# Patient Record
Sex: FEMALE | Race: WHITE | NOT HISPANIC OR LATINO | URBAN - METROPOLITAN AREA
[De-identification: names, ages, dates, MRNs, and addresses within clinical notes are randomized per-mention and may not be internally consistent; named-entity substitution may affect disease eponyms.]

---
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
## 2025-06-25 DIAGNOSIS — N94.6 DYSMENORRHEA IN ADOLESCENT: ICD-10-CM

## 2025-06-26 RX ORDER — DESOGESTREL AND ETHINYL ESTRADIOL 0.15-0.03
1 KIT ORAL DAILY
Qty: 84 TABLET | Refills: 0 | Status: SHIPPED | OUTPATIENT
Start: 2025-06-26 | End: 2025-09-18

## 2025-07-07 ENCOUNTER — OFFICE VISIT (OUTPATIENT)
Dept: OBSTETRICS AND GYNECOLOGY | Facility: CLINIC | Age: 20
End: 2025-07-07
Payer: COMMERCIAL

## 2025-07-07 VITALS
BODY MASS INDEX: 24.79 KG/M2 | SYSTOLIC BLOOD PRESSURE: 116 MMHG | HEIGHT: 65 IN | DIASTOLIC BLOOD PRESSURE: 74 MMHG | WEIGHT: 148.8 LBS

## 2025-07-07 DIAGNOSIS — R10.2 PELVIC PAIN: Primary | ICD-10-CM

## 2025-07-07 PROCEDURE — 3008F BODY MASS INDEX DOCD: CPT | Performed by: OBSTETRICS & GYNECOLOGY

## 2025-07-07 PROCEDURE — 99213 OFFICE O/P EST LOW 20 MIN: CPT | Performed by: OBSTETRICS & GYNECOLOGY

## 2025-07-07 RX ORDER — NORETHINDRONE ACETATE AND ETHINYL ESTRADIOL, ETHINYL ESTRADIOL AND FERROUS FUMARATE 1MG-10(24)
1 KIT ORAL DAILY
Qty: 28 TABLET | Refills: 1 | Status: SHIPPED | OUTPATIENT
Start: 2025-07-07 | End: 2025-09-05

## 2025-07-07 ASSESSMENT — ENCOUNTER SYMPTOMS
NEUROLOGICAL NEGATIVE: 0
MUSCULOSKELETAL NEGATIVE: 0
EYES NEGATIVE: 0
HEMATOLOGIC/LYMPHATIC NEGATIVE: 0
ENDOCRINE NEGATIVE: 0
CARDIOVASCULAR NEGATIVE: 0
RESPIRATORY NEGATIVE: 1
RESPIRATORY NEGATIVE: 0
PSYCHIATRIC NEGATIVE: 0
ALLERGIC/IMMUNOLOGIC NEGATIVE: 0
ENDOCRINE NEGATIVE: 1
CONSTITUTIONAL NEGATIVE: 0
GASTROINTESTINAL NEGATIVE: 0

## 2025-07-07 ASSESSMENT — PAIN SCALES - GENERAL: PAINLEVEL_OUTOF10: 0-NO PAIN

## 2025-07-07 NOTE — PROGRESS NOTES
Subjective   Patient ID: Jaclyn Scott is a 20 y.o. female who presents for Vaginal Bleeding (Reports constant cycle for past 2 mos/Currently on ocp's, reports blood bright red in color).  Vaginal Bleeding      States that she has been taking her birth control for 6 yrs, first few years she had 2-3 months of breakthrough bleeding that lasted days. Since this past may she has been bleeding every day, which she states is a large amount of blood. States it is approximately 3 days of bright red blood and then switches to dark red, and cycles with that pattern. She states she sis also passing clots. She said this is associated with debilitating cramps. States she is consistently taking Apri every morning at the same time. States that she has cramps with urination in the morning. She states that in the past she had similar episodes of bleeding that were shorter. She states that she feels this new episode is different and worse. 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 States she is not currently sexually active.     Review of Systems   Respiratory: Negative.     Endocrine: Negative.    Genitourinary:  Positive for vaginal bleeding.     Headache: present in both eyes, travels to bilateral temporal regions, describes as dull throbbing pain. She states her headache is not out of the ordinary for her.    Objective   Physical Exam  Cardiovascular:      Rate and Rhythm: Normal rate and regular rhythm.      Pulses: Normal pulses.   Pulmonary:      Effort: Pulmonary effort is normal.      Breath sounds: Normal breath sounds.   Abdominal:      General: Abdomen is flat. Bowel sounds are normal. There is no distension.      Palpations: Abdomen is soft. There is no mass.      Tenderness: There is abdominal tenderness. There is no guarding.   Psychiatric:         Mood and Affect: Mood normal.         Behavior: Behavior normal.     Abdominal tenderness present in mid-epigastric region with light and deep palpation. No  masses or abnormalities felt on palpation.    Assessment/Plan   Diagnoses and all orders for this visit:  Pelvic pain  -     US PELVIS TRANSABDOMINAL WITH TRANSVAGINAL; Future  -     norethindrone-e.estradioL-iron (Lo Loestrin Fe) 1 mg-10 mcg (24)/10 mcg (2) tablet; Take 1 tablet by mouth once daily.    - Patient was agreeable to trial of new contraceptive pill (Lo Loestrin) due to suspicion for current regimen (Apri) as etiology for bleeding. -Additionally, she was agreeable to repeating the Pelvic ultrasound to assess for structural uterine abnormalities, as her last Pelvic US was approximately 1 year ago.  - Return to clinic pending results of Pelvic US, or as needed for follow up.       Saul King DO 07/07/25 4:01 PM

## 2025-07-10 ENCOUNTER — HOSPITAL ENCOUNTER (OUTPATIENT)
Dept: RADIOLOGY | Facility: CLINIC | Age: 20
Discharge: HOME | End: 2025-07-10
Payer: COMMERCIAL

## 2025-07-10 DIAGNOSIS — R10.2 PELVIC PAIN: ICD-10-CM

## 2025-07-10 PROCEDURE — 76856 US EXAM PELVIC COMPLETE: CPT

## 2025-07-21 ENCOUNTER — OFFICE VISIT (OUTPATIENT)
Dept: PEDIATRICS | Facility: CLINIC | Age: 20
End: 2025-07-21
Payer: COMMERCIAL

## 2025-07-21 VITALS — WEIGHT: 146.3 LBS | TEMPERATURE: 98 F | BODY MASS INDEX: 24.37 KG/M2 | HEIGHT: 65 IN

## 2025-07-21 DIAGNOSIS — L50.9 URTICARIA: Primary | ICD-10-CM

## 2025-07-21 PROCEDURE — 3008F BODY MASS INDEX DOCD: CPT | Performed by: PEDIATRICS

## 2025-07-21 PROCEDURE — 99213 OFFICE O/P EST LOW 20 MIN: CPT | Performed by: PEDIATRICS

## 2025-07-21 NOTE — PROGRESS NOTES
"Subjective   Patient ID: Jaclyn Scott is a 20 y.o. female who presents for Rash.  Today she is accompanied by alone.     HPI    Hives on body yesterday - all day  Could not sleep because she was so itchy  Woke with the hives today  Improved now  Did take benadryl last night without much help  Did not take today  No illness  No fevers  No new foods  No new exposures  Her OCP brand was switched- has been taking the new brand x 7 days    Review of systems negative unless otherwise indicated in HPI    Objective   Temp 36.7 °C (98 °F)   Ht 1.645 m (5' 4.75\")   Wt 66.4 kg (146 lb 4.8 oz)   LMP 05/25/2025 (Exact Date)   BMI 24.53 kg/m²     Physical Exam  General: alert, active, in no acute distress  Hydration: well-hydrated, mucous membranes moist, good skin turgor  Eyes: conjunctiva clear  Ears: TM's normal, external auditory canals are clear   Nose: clear, no discharge  Throat: moist mucous membranes without erythema, exudates or petechiae, no post-nasal drainage seen  Neck: no lymphadenopathy  Lungs: clear to auscultation, no wheezing, crackles or rhonchi, breathing unlabored  Heart: Normal PMI. regular rate and rhythm, normal S1, S2, no murmurs or gallops.     Assessment/Plan   Problem List Items Addressed This Visit    None  Visit Diagnoses         Urticaria    -  Primary          Urticaria- likely viral induced  Supportive Care  Call if worse, not improved, new fever  If hives persist into the end of the week consider a course of oral steroid    Khadra Waggoner MD   "

## 2025-08-04 ENCOUNTER — PATIENT MESSAGE (OUTPATIENT)
Dept: OBSTETRICS AND GYNECOLOGY | Facility: CLINIC | Age: 20
End: 2025-08-04
Payer: COMMERCIAL

## 2025-08-04 DIAGNOSIS — R10.2 PELVIC PAIN: ICD-10-CM

## 2025-08-05 RX ORDER — NORETHINDRONE ACETATE AND ETHINYL ESTRADIOL, ETHINYL ESTRADIOL AND FERROUS FUMARATE 1MG-10(24)
1 KIT ORAL DAILY
Qty: 112 TABLET | Refills: 3 | Status: SHIPPED | OUTPATIENT
Start: 2025-08-05 | End: 2026-08-05

## 2025-09-04 ENCOUNTER — APPOINTMENT (OUTPATIENT)
Dept: OBSTETRICS AND GYNECOLOGY | Facility: CLINIC | Age: 20
End: 2025-09-04
Payer: COMMERCIAL